# Patient Record
Sex: MALE | Race: BLACK OR AFRICAN AMERICAN | NOT HISPANIC OR LATINO | URBAN - METROPOLITAN AREA
[De-identification: names, ages, dates, MRNs, and addresses within clinical notes are randomized per-mention and may not be internally consistent; named-entity substitution may affect disease eponyms.]

---

## 2017-12-11 ENCOUNTER — EMERGENCY (EMERGENCY)
Facility: HOSPITAL | Age: 52
LOS: 1 days | Discharge: HOME | End: 2017-12-11
Attending: EMERGENCY MEDICINE | Admitting: EMERGENCY MEDICINE
Payer: COMMERCIAL

## 2017-12-11 VITALS
SYSTOLIC BLOOD PRESSURE: 154 MMHG | DIASTOLIC BLOOD PRESSURE: 72 MMHG | TEMPERATURE: 98 F | HEART RATE: 76 BPM | RESPIRATION RATE: 16 BRPM | OXYGEN SATURATION: 100 %

## 2017-12-11 VITALS
TEMPERATURE: 98 F | RESPIRATION RATE: 18 BRPM | SYSTOLIC BLOOD PRESSURE: 128 MMHG | HEART RATE: 62 BPM | DIASTOLIC BLOOD PRESSURE: 70 MMHG | OXYGEN SATURATION: 99 %

## 2017-12-11 DIAGNOSIS — K25.1 ACUTE GASTRIC ULCER WITH PERFORATION: Chronic | ICD-10-CM

## 2017-12-11 LAB
ALBUMIN SERPL ELPH-MCNC: 4.1 G/DL — SIGNIFICANT CHANGE UP (ref 3.3–5)
ALP SERPL-CCNC: 83 U/L — SIGNIFICANT CHANGE UP (ref 40–120)
ALT FLD-CCNC: 22 U/L — SIGNIFICANT CHANGE UP (ref 10–45)
ANION GAP SERPL CALC-SCNC: 11 MMOL/L — SIGNIFICANT CHANGE UP (ref 5–17)
APPEARANCE UR: CLEAR — SIGNIFICANT CHANGE UP
AST SERPL-CCNC: 19 U/L — SIGNIFICANT CHANGE UP (ref 10–40)
BASOPHILS NFR BLD AUTO: 0.5 % — SIGNIFICANT CHANGE UP (ref 0–2)
BILIRUB SERPL-MCNC: 0.3 MG/DL — SIGNIFICANT CHANGE UP (ref 0.2–1.2)
BILIRUB UR-MCNC: NEGATIVE — SIGNIFICANT CHANGE UP
BUN SERPL-MCNC: 9 MG/DL — SIGNIFICANT CHANGE UP (ref 7–23)
CALCIUM SERPL-MCNC: 9.2 MG/DL — SIGNIFICANT CHANGE UP (ref 8.4–10.5)
CHLORIDE SERPL-SCNC: 102 MMOL/L — SIGNIFICANT CHANGE UP (ref 96–108)
CO2 SERPL-SCNC: 28 MMOL/L — SIGNIFICANT CHANGE UP (ref 22–31)
COLOR SPEC: YELLOW — SIGNIFICANT CHANGE UP
CREAT SERPL-MCNC: 0.92 MG/DL — SIGNIFICANT CHANGE UP (ref 0.5–1.3)
DIFF PNL FLD: NEGATIVE — SIGNIFICANT CHANGE UP
EOSINOPHIL NFR BLD AUTO: 1.4 % — SIGNIFICANT CHANGE UP (ref 0–6)
ETHANOL SERPL-MCNC: <10 MG/DL — SIGNIFICANT CHANGE UP (ref 0–10)
EXTRA BLUE TOP TUBE: SIGNIFICANT CHANGE UP
EXTRA SST TUBE: SIGNIFICANT CHANGE UP
GLUCOSE SERPL-MCNC: 75 MG/DL — SIGNIFICANT CHANGE UP (ref 70–99)
GLUCOSE UR QL: NEGATIVE — SIGNIFICANT CHANGE UP
HCT VFR BLD CALC: 35.5 % — LOW (ref 39–50)
HGB BLD-MCNC: 11.6 G/DL — LOW (ref 13–17)
KETONES UR-MCNC: NEGATIVE — SIGNIFICANT CHANGE UP
LEUKOCYTE ESTERASE UR-ACNC: NEGATIVE — SIGNIFICANT CHANGE UP
LYMPHOCYTES # BLD AUTO: 13.2 % — SIGNIFICANT CHANGE UP (ref 13–44)
MCHC RBC-ENTMCNC: 26.3 PG — LOW (ref 27–34)
MCHC RBC-ENTMCNC: 32.7 G/DL — SIGNIFICANT CHANGE UP (ref 32–36)
MCV RBC AUTO: 80.5 FL — SIGNIFICANT CHANGE UP (ref 80–100)
MONOCYTES NFR BLD AUTO: 10.5 % — SIGNIFICANT CHANGE UP (ref 2–14)
NEUTROPHILS NFR BLD AUTO: 74.4 % — SIGNIFICANT CHANGE UP (ref 43–77)
NITRITE UR-MCNC: NEGATIVE — SIGNIFICANT CHANGE UP
PCP SPEC-MCNC: SIGNIFICANT CHANGE UP
PH UR: 6 — SIGNIFICANT CHANGE UP (ref 5–8)
PLATELET # BLD AUTO: 382 K/UL — SIGNIFICANT CHANGE UP (ref 150–400)
POTASSIUM SERPL-MCNC: 3.7 MMOL/L — SIGNIFICANT CHANGE UP (ref 3.5–5.3)
POTASSIUM SERPL-SCNC: 3.7 MMOL/L — SIGNIFICANT CHANGE UP (ref 3.5–5.3)
PROT SERPL-MCNC: 7.6 G/DL — SIGNIFICANT CHANGE UP (ref 6–8.3)
PROT UR-MCNC: (no result) MG/DL
RBC # BLD: 4.41 M/UL — SIGNIFICANT CHANGE UP (ref 4.2–5.8)
RBC # FLD: 17 % — HIGH (ref 10.3–16.9)
SODIUM SERPL-SCNC: 141 MMOL/L — SIGNIFICANT CHANGE UP (ref 135–145)
SP GR SPEC: 1.02 — SIGNIFICANT CHANGE UP (ref 1–1.03)
UROBILINOGEN FLD QL: 0.2 E.U./DL — SIGNIFICANT CHANGE UP
WBC # BLD: 11.2 K/UL — HIGH (ref 3.8–10.5)
WBC # FLD AUTO: 11.2 K/UL — HIGH (ref 3.8–10.5)

## 2017-12-11 PROCEDURE — 82962 GLUCOSE BLOOD TEST: CPT

## 2017-12-11 PROCEDURE — 80053 COMPREHEN METABOLIC PANEL: CPT

## 2017-12-11 PROCEDURE — 36415 COLL VENOUS BLD VENIPUNCTURE: CPT

## 2017-12-11 PROCEDURE — 93010 ELECTROCARDIOGRAM REPORT: CPT

## 2017-12-11 PROCEDURE — 70450 CT HEAD/BRAIN W/O DYE: CPT

## 2017-12-11 PROCEDURE — 85025 COMPLETE CBC W/AUTO DIFF WBC: CPT

## 2017-12-11 PROCEDURE — 80307 DRUG TEST PRSMV CHEM ANLYZR: CPT

## 2017-12-11 PROCEDURE — 93005 ELECTROCARDIOGRAM TRACING: CPT

## 2017-12-11 PROCEDURE — 99284 EMERGENCY DEPT VISIT MOD MDM: CPT | Mod: 25

## 2017-12-11 PROCEDURE — 81001 URINALYSIS AUTO W/SCOPE: CPT

## 2017-12-11 PROCEDURE — 70450 CT HEAD/BRAIN W/O DYE: CPT | Mod: 26

## 2017-12-11 RX ORDER — SODIUM CHLORIDE 9 MG/ML
1000 INJECTION INTRAMUSCULAR; INTRAVENOUS; SUBCUTANEOUS ONCE
Qty: 0 | Refills: 0 | Status: COMPLETED | OUTPATIENT
Start: 2017-12-11 | End: 2017-12-11

## 2017-12-11 RX ADMIN — SODIUM CHLORIDE 2000 MILLILITER(S): 9 INJECTION INTRAMUSCULAR; INTRAVENOUS; SUBCUTANEOUS at 09:43

## 2017-12-11 NOTE — ED PROVIDER NOTE - OBJECTIVE STATEMENT
52y M current 1/3 PPD smoker PMHx DM1 (baseline FS  w/ episodes hypoglycemia) BIBA from worker, after collapsing c/b head trauma found to be hypoglycemic s/p dextrose. Per patient, he woke up this AM w fingerstick 49, then went to work, while on way to work eating food. He takes 15NPH 10u reg in AM and 10NPH 5u reg in PM. 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 Per witness (at bedside), collapsed and hit his head on marble floor. Per pt, no HA, photo/phobia, change in vision, NV, neck stiffness. EMS was called and gave him dextrose, and he woke up. - full body shaking, tongue biting, bowel/bladder incontinence. Pt admits currently at baseline with no symptoms or complaints. Pt states he is episodically hypoglycemic, with readings in the 20s w/ - LOC and his MD (Dr. Charan Dennis) has been decreasing his insulin dose over time.  Denies CP palpations SOB HAHN Abd Pain weakness numbness/tinglign NO URI/infectious sx. - recent travel - sick contacts 52y M current 1/3 PPD smoker PMHx DM1 (baseline FS  w/ episodes hypoglycemia) BIBA from worker, after collapsing c/b head trauma found to be hypoglycemic s/p dextrose. Per patient, he woke up this AM w fingerstick 49, then went to work, while on way to work eating food. He takes 15NPH 10u reg in AM and 10NPH 5u reg in PM. 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 Per witness (at bedside), collapsed and hit his head on marble floor. Per pt, no HA, photo/phobia, change in vision, NV, neck stiffness. EMS was called and gave him dextrose, and he woke up. - full body shaking, tongue biting, bowel/bladder incontinence. Pt admits currently at baseline with no symptoms or complaints. Pt states he is episodically hypoglycemic, with readings in the 20s w/ - LOC and his MD (Dr. Charan Gomez [facility]) has been decreasing his insulin dose over time.  Denies CP palpations SOB HAHN Abd Pain weakness numbness/tingling NO URI/infectious sx. - recent travel - sick contacts    PMD: Dr. Charan Gomez [phone] [facility]

## 2017-12-11 NOTE — ED ADULT TRIAGE NOTE - OTHER COMPLAINTS
hx dm, admits to taking daily insulin this am. fs 46 while at work. as per coworkers pt had a seizure with pos head trauma. pt denies seizure-no daily blood thinners. denies pain. given 1 amp glucose by ems, fs 95

## 2017-12-11 NOTE — ED PROVIDER NOTE - ATTENDING CONTRIBUTION TO CARE
agree with resident - 52y M with IDDM1 BIBA who was BIBEMS s/p episode of hypoglycemia relieved with dextrose c/b fall and trauma to head. Pt no asymptomatic reports known transient episodes of hypoglycemia. CTH negative, Labs WNL, Tox - bETOH -. LOC 2/2 to hypoglycemia with no other underlying concerns.  Recommended outpatient follow with PMD and potential adjustment to home insulin regimen. Stable for DC

## 2017-12-11 NOTE — ED ADULT NURSE NOTE - CHPI ED SYMPTOMS NEG
no nausea/no numbness/no chills/no fever/no decreased eating/drinking/no pain/no tingling/no weakness/no dizziness/no vomiting

## 2017-12-11 NOTE — ED ADULT NURSE NOTE - OBJECTIVE STATEMENT
Pt BIBA to ED A&Ox3 for hypoglycemia. 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 EMS states pt blood sugar was 46, gave 1 amp d50 by mouth. pt states he does not remember incident, pt took 10 units regular insulin and 15 units NPH this morning, ate a few breadsticks. pt denies dizziness, headache, cp/sob, vision changes, n/v/d, fever.

## 2017-12-15 DIAGNOSIS — E10.649 TYPE 1 DIABETES MELLITUS WITH HYPOGLYCEMIA WITHOUT COMA: ICD-10-CM

## 2017-12-15 DIAGNOSIS — R51 HEADACHE: ICD-10-CM

## 2017-12-15 DIAGNOSIS — Z79.4 LONG TERM (CURRENT) USE OF INSULIN: ICD-10-CM

## 2017-12-15 DIAGNOSIS — F17.210 NICOTINE DEPENDENCE, CIGARETTES, UNCOMPLICATED: ICD-10-CM

## 2018-08-07 ENCOUNTER — EMERGENCY (EMERGENCY)
Facility: HOSPITAL | Age: 53
LOS: 1 days | Discharge: HOME | End: 2018-08-07
Attending: EMERGENCY MEDICINE | Admitting: EMERGENCY MEDICINE
Payer: COMMERCIAL

## 2018-08-07 VITALS
HEIGHT: 67 IN | TEMPERATURE: 98 F | WEIGHT: 139.99 LBS | DIASTOLIC BLOOD PRESSURE: 91 MMHG | HEART RATE: 72 BPM | RESPIRATION RATE: 16 BRPM | OXYGEN SATURATION: 100 % | SYSTOLIC BLOOD PRESSURE: 150 MMHG

## 2018-08-07 VITALS
RESPIRATION RATE: 16 BRPM | DIASTOLIC BLOOD PRESSURE: 74 MMHG | OXYGEN SATURATION: 100 % | TEMPERATURE: 98 F | SYSTOLIC BLOOD PRESSURE: 148 MMHG | HEART RATE: 71 BPM

## 2018-08-07 DIAGNOSIS — E11.649 TYPE 2 DIABETES MELLITUS WITH HYPOGLYCEMIA WITHOUT COMA: ICD-10-CM

## 2018-08-07 DIAGNOSIS — K25.1 ACUTE GASTRIC ULCER WITH PERFORATION: Chronic | ICD-10-CM

## 2018-08-07 DIAGNOSIS — Z79.4 LONG TERM (CURRENT) USE OF INSULIN: ICD-10-CM

## 2018-08-07 PROBLEM — E11.9 TYPE 2 DIABETES MELLITUS WITHOUT COMPLICATIONS: Chronic | Status: ACTIVE | Noted: 2017-12-11

## 2018-08-07 LAB
ALBUMIN SERPL ELPH-MCNC: 3.9 G/DL — SIGNIFICANT CHANGE UP (ref 3.3–5)
ALP SERPL-CCNC: 69 U/L — SIGNIFICANT CHANGE UP (ref 40–120)
ALT FLD-CCNC: 22 U/L — SIGNIFICANT CHANGE UP (ref 10–45)
ANION GAP SERPL CALC-SCNC: 10 MMOL/L — SIGNIFICANT CHANGE UP (ref 5–17)
AST SERPL-CCNC: 24 U/L — SIGNIFICANT CHANGE UP (ref 10–40)
BASOPHILS NFR BLD AUTO: 0.5 % — SIGNIFICANT CHANGE UP (ref 0–2)
BILIRUB SERPL-MCNC: 0.2 MG/DL — SIGNIFICANT CHANGE UP (ref 0.2–1.2)
BUN SERPL-MCNC: 12 MG/DL — SIGNIFICANT CHANGE UP (ref 7–23)
CALCIUM SERPL-MCNC: 8.8 MG/DL — SIGNIFICANT CHANGE UP (ref 8.4–10.5)
CHLORIDE SERPL-SCNC: 100 MMOL/L — SIGNIFICANT CHANGE UP (ref 96–108)
CO2 SERPL-SCNC: 28 MMOL/L — SIGNIFICANT CHANGE UP (ref 22–31)
CREAT SERPL-MCNC: 1 MG/DL — SIGNIFICANT CHANGE UP (ref 0.5–1.3)
EOSINOPHIL NFR BLD AUTO: 0.9 % — SIGNIFICANT CHANGE UP (ref 0–6)
EXTRA BLUE TOP TUBE: SIGNIFICANT CHANGE UP
EXTRA SST TUBE: SIGNIFICANT CHANGE UP
GLUCOSE SERPL-MCNC: 324 MG/DL — HIGH (ref 70–99)
HCT VFR BLD CALC: 33.4 % — LOW (ref 39–50)
HGB BLD-MCNC: 10.5 G/DL — LOW (ref 13–17)
LYMPHOCYTES # BLD AUTO: 9.2 % — LOW (ref 13–44)
MCHC RBC-ENTMCNC: 25.5 PG — LOW (ref 27–34)
MCHC RBC-ENTMCNC: 31.4 G/DL — LOW (ref 32–36)
MCV RBC AUTO: 81.1 FL — SIGNIFICANT CHANGE UP (ref 80–100)
MONOCYTES NFR BLD AUTO: 7.9 % — SIGNIFICANT CHANGE UP (ref 2–14)
NEUTROPHILS NFR BLD AUTO: 81.5 % — HIGH (ref 43–77)
PLATELET # BLD AUTO: 320 K/UL — SIGNIFICANT CHANGE UP (ref 150–400)
POTASSIUM SERPL-MCNC: 3.6 MMOL/L — SIGNIFICANT CHANGE UP (ref 3.5–5.3)
POTASSIUM SERPL-SCNC: 3.6 MMOL/L — SIGNIFICANT CHANGE UP (ref 3.5–5.3)
PROT SERPL-MCNC: 6.6 G/DL — SIGNIFICANT CHANGE UP (ref 6–8.3)
RBC # BLD: 4.12 M/UL — LOW (ref 4.2–5.8)
RBC # FLD: 18.8 % — HIGH (ref 10.3–16.9)
SODIUM SERPL-SCNC: 138 MMOL/L — SIGNIFICANT CHANGE UP (ref 135–145)
WBC # BLD: 12.3 K/UL — HIGH (ref 3.8–10.5)
WBC # FLD AUTO: 12.3 K/UL — HIGH (ref 3.8–10.5)

## 2018-08-07 PROCEDURE — 99283 EMERGENCY DEPT VISIT LOW MDM: CPT

## 2018-08-07 PROCEDURE — 99284 EMERGENCY DEPT VISIT MOD MDM: CPT

## 2018-08-07 PROCEDURE — 82962 GLUCOSE BLOOD TEST: CPT

## 2018-08-07 PROCEDURE — 85025 COMPLETE CBC W/AUTO DIFF WBC: CPT

## 2018-08-07 PROCEDURE — 80053 COMPREHEN METABOLIC PANEL: CPT

## 2018-08-07 NOTE — ED ADULT NURSE NOTE - NSIMPLEMENTINTERV_GEN_ALL_ED
Implemented All Universal Safety Interventions:  [place] to call system. Call bell, personal items and telephone within reach. Instruct patient to call for assistance. Room bathroom lighting operational. Non-slip footwear when patient is off stretcher. Physically safe environment: no spills, clutter or unnecessary equipment. Stretcher in lowest position, wheels locked, appropriate side rails in place.

## 2018-08-07 NOTE — ED PROVIDER NOTE - OBJECTIVE STATEMENT
52 yo male with hx of IDDM (since 10 years of age), p/w low glucose while at work. Pt states he ate a bagel and drank apple juice and went to work and then took his prescribed 10 units of regular insulin and 15 units of NPH and does not recall what happened after. Per EMS, pt was working when he became altered and combative. EMs arrived and pt's FSBG was 24 and pt was given 1amp of D50, 1 unit of Glucagon and pt back at baseline now. Pt is AAO X 3 and states "I think I did not eat enough". Usually eats more in the morning and at night, which he did not. No CP, SOB, N,V,D, fevers, chills, HA, trauma, injury, recent illness. No change in his insulin dosages x 5 years. Pt states he takes 10 units regular Insulin and 15 NPH in the morning and 5 units regular insulin and 10 NPH at night. 52 yo male with hx of IDDM (since 10 years of age), p/w low glucose while at work. Pt states he ate a bagel and drank apple juice and went to work and then took his prescribed 10 units of regular insulin and 15 units of NPH and does not recall what happened after. Per EMS, pt was working when he became altered and combative. EMs arrived and pt's FSBG was 24 and pt was given 1amp of D50, 1 unit of Glucagon and pt back at baseline now. Pt is AAO X 3 and states "I think I did not eat enough". Usually eats more in the morning and at night, which he did not. No CP, SOB, N,V,D, fevers, chills, HA, trauma, injury, recent illness. No change in his insulin dosages x 5 years. Pt states he takes 10 units regular Insulin and 15 NPH in the morning and 5 units regular insulin and 10 NPH at night. Does not take any oral agents for DM.

## 2018-08-07 NOTE — ED ADULT TRIAGE NOTE - CHIEF COMPLAINT QUOTE
pt was working when he became altered and combative, medics arrived FS was 24, pt given 1amp of D50, 1 unit of Glucagon FS now 343. pt is aaox3, no complaints at this time. pt is diabetic & on insulin.

## 2018-08-07 NOTE — ED ADULT NURSE NOTE - OBJECTIVE STATEMENT
54 y/o male pmhx type I DM on regular insulin BIBEMS, pt was working when he became altered and combative, medics arrived FS was 24, pt given 1amp of D50, 1 unit of Glucagon FS now 343. pt is aaox3, no complaints at this time. pt reports eating a little something on his way to work and taking his medication normally.

## 2018-08-07 NOTE — ED PROVIDER NOTE - MEDICAL DECISION MAKING DETAILS
54 yo male with hx of IDDM (since 10 years of age), p/w low glucose while at work. Pt states he ate a bagel and drank apple juice and went to work and then took his prescribed 10 units of regular insulin and 15 units of NPH and does not recall what happened after. Per EMS, pt was working when he became altered and combative. EMs arrived and pt's FSBG was 24 and pt was given 1amp of D50, 1 unit of Glucagon and pt back at baseline now. Pt is AAO X 3 and states "I think I did not eat enough". Pt observed in ED and remained AAO and in NAD. Given sandwiches and juice. Feeling better and requesting dc. Pt is not on any oral hypoglycemic agents. Labs noted and with no acute renal dysfunction and pt to f/up outpt. 54 yo male with hx of IDDM (since 10 years of age), p/w low glucose while at work. Pt states he ate a bagel and drank apple juice and went to work and then took his prescribed 10 units of regular insulin and 15 units of NPH and does not recall what happened after. Per EMS, pt was working when he became altered and combative. EMs arrived and pt's FSBG was 24 and pt was given 1amp of D50, 1 unit of Glucagon and pt back at baseline now. Pt is AAO X 3 and states "I think I did not eat enough". Pt observed in ED and remained AAO and in NAD. Given sandwiches and juice. Feeling better and requesting dc. Pt is not on any oral hypoglycemic agents. Labs noted and pt with no acute renal dysfunction, and pt to f/up outpt. Pt also denies any recent illness/ cough/ fevers/ chills/ URI sxs/ urinary sxs.

## 2022-09-13 NOTE — ED ADULT NURSE NOTE - NS PRO PASSIVE SMOKE EXP
Unknown Doxepin Pregnancy And Lactation Text: This medication is Pregnancy Category C and it isn't known if it is safe during pregnancy. It is also excreted in breast milk and breast feeding isn't recommended.

## 2023-05-01 NOTE — ED PROVIDER NOTE - CPE EDP HEME LYMPH NORM
Continue meclizine as needed
Continue omeprazole  Will add on Pepcid  Recommended he avoid trigger foods  Referral to GI
Continue omeprazole  Will add on Pepcid  Recommended he avoid trigger foods  Referral to GI
Continue omeprazole  Will add on Pepcid  Recommended he avoid trigger foods  Referral to GI    Script for zofran PRN
- - -

## 2024-04-12 NOTE — ED ADULT TRIAGE NOTE - NS ED NURSE BANDS TYPE
Detail Level: Detailed
Add 14788 Cpt? (Important Note: In 2017 The Use Of 52697 Is Being Tracked By Cms To Determine Future Global Period Reimbursement For Global Periods): yes
Name band;

## 2024-10-17 NOTE — ED PROVIDER NOTE - RESPIRATORY, MLM
Regarding: WI blood in urine and burns when he urinates     No contact due to noted EPIC documentation of Urgent care visit today on 10/17 prior to Triage RN call back. SMS text sent for the caller to call us back if they still need to speak with a nurse. Advised to call 911 if this is a life-threatening emergency.    Reason for Disposition   Patient already left for the hospital/clinic    Protocols used: No Contact or Duplicate Contact Call-A-[place]     Breath sounds clear and equal bilaterally.